# Patient Record
Sex: FEMALE | Race: WHITE | NOT HISPANIC OR LATINO | URBAN - METROPOLITAN AREA
[De-identification: names, ages, dates, MRNs, and addresses within clinical notes are randomized per-mention and may not be internally consistent; named-entity substitution may affect disease eponyms.]

---

## 2018-02-05 ENCOUNTER — EMERGENCY (EMERGENCY)
Facility: HOSPITAL | Age: 63
LOS: 1 days | Discharge: HOME | End: 2018-02-05
Attending: EMERGENCY MEDICINE
Payer: SELF-PAY

## 2018-02-05 VITALS
SYSTOLIC BLOOD PRESSURE: 107 MMHG | HEIGHT: 65 IN | WEIGHT: 139.99 LBS | HEART RATE: 92 BPM | DIASTOLIC BLOOD PRESSURE: 62 MMHG | TEMPERATURE: 99 F | RESPIRATION RATE: 16 BRPM

## 2018-02-05 PROCEDURE — 99284 EMERGENCY DEPT VISIT MOD MDM: CPT | Mod: 25

## 2018-02-05 PROCEDURE — 99053 MED SERV 10PM-8AM 24 HR FAC: CPT

## 2018-02-05 NOTE — ED ADULT TRIAGE NOTE - CHIEF COMPLAINT QUOTE
She has generalized body ache.  Patient has stage 4 brain and lung cancer She has generalized body ache.  Patient has stage 4 brain and lung cancer.    Addendum : Pt was sleeping on a stretcher, as per registration pt was called numerous times to register without any answer.  Pt claims , she was sleeping in pain.

## 2018-02-06 VITALS
DIASTOLIC BLOOD PRESSURE: 84 MMHG | HEART RATE: 86 BPM | TEMPERATURE: 98 F | RESPIRATION RATE: 16 BRPM | OXYGEN SATURATION: 94 % | SYSTOLIC BLOOD PRESSURE: 127 MMHG

## 2018-02-06 RX ORDER — ACETAMINOPHEN 500 MG
975 TABLET ORAL ONCE
Qty: 0 | Refills: 0 | Status: COMPLETED | OUTPATIENT
Start: 2018-02-06 | End: 2018-02-06

## 2018-02-06 RX ADMIN — Medication 975 MILLIGRAM(S): at 07:14

## 2018-02-06 RX ADMIN — Medication 975 MILLIGRAM(S): at 04:48

## 2018-02-06 NOTE — ED ADULT NURSE NOTE - OBJECTIVE STATEMENT
· Chief Complaint: The patient is a 62y Female complaining of chronic right rib tenderness. No fever, no chest pain, no shortness of breath.

## 2018-02-06 NOTE — ED PROVIDER NOTE - OBJECTIVE STATEMENT
Pt initially sleeping. Pt staes chronic right rib tenderness Pt initially sleeping-placed on status board at 3:50am . Pt sates chronic right rib tenderness. No fever, no chest pain, no shortness of breath.

## 2018-02-06 NOTE — ED PROVIDER NOTE - MEDICAL DECISION MAKING DETAILS
Pt adamantly requesting discharge and refused rib xrays. Pt denies trauma, no chest pain. Pt is A&O x 3. Pt is well appearing walking with normal gait, stable for discharge and follow up with medical doctor. Pt educated on care and need for follow up. Discussed anticipatory guidance and return precautions. Questions answered. I had a detailed discussion with the patient and/or guardian regarding the historical points, exam findings, and any diagnostic results supporting the discharge diagnosis.

## 2021-07-07 NOTE — ED ADULT NURSE NOTE - CCCP TRG CHIEF CMPLNT
generalized body ache Quality 431: Preventive Care And Screening: Unhealthy Alcohol Use - Screening: Patient screened for unhealthy alcohol use using a single question and scores less than 2 times per year Detail Level: Detailed Quality 110: Preventive Care And Screening: Influenza Immunization: Influenza Immunization Administered during Influenza season Quality 226: Preventive Care And Screening: Tobacco Use: Screening And Cessation Intervention: Patient screened for tobacco use and is an ex/non-smoker Quality 111:Pneumonia Vaccination Status For Older Adults: Pneumococcal Vaccination Previously Received Quality 130: Documentation Of Current Medications In The Medical Record: Current Medications Documented

## 2021-12-10 NOTE — ED ADULT NURSE NOTE - FALL HARM RISK CONCLUSION
Expected Date Of Service: 12/10/2021 Performing Laboratory: -[id number] Billing Type: Third-Party Bill Bill For Surgical Tray: no Fall Risk

## 2023-09-28 NOTE — ED ADULT TRIAGE NOTE - NS ED NURSE AMBULANCES
Pt discharged to home. Pt belongings reconciled. IVs removed x2, tolerated well. Tele box removed. Pt escorted out of facility to private vehicle via w/c.   
This nurse to pt room to educated pt on scheduled cardiac procedure and obtain consent. Pt is awake,alert and oriented x's 4. Pt verbalized understanding of procedure and signed consent at this time.  
[facility]